# Patient Record
Sex: FEMALE
[De-identification: names, ages, dates, MRNs, and addresses within clinical notes are randomized per-mention and may not be internally consistent; named-entity substitution may affect disease eponyms.]

---

## 2022-08-27 ENCOUNTER — NON-APPOINTMENT (OUTPATIENT)
Age: 37
End: 2022-08-27

## 2023-03-23 PROBLEM — Z00.00 ENCOUNTER FOR PREVENTIVE HEALTH EXAMINATION: Status: ACTIVE | Noted: 2023-03-23

## 2023-03-30 ENCOUNTER — APPOINTMENT (OUTPATIENT)
Dept: OTOLARYNGOLOGY | Facility: CLINIC | Age: 38
End: 2023-03-30
Payer: COMMERCIAL

## 2023-03-30 VITALS — HEIGHT: 63 IN | WEIGHT: 140 LBS | BODY MASS INDEX: 24.8 KG/M2

## 2023-03-30 DIAGNOSIS — Z78.9 OTHER SPECIFIED HEALTH STATUS: ICD-10-CM

## 2023-03-30 DIAGNOSIS — G47.8 OTHER SLEEP DISORDERS: ICD-10-CM

## 2023-03-30 DIAGNOSIS — Z80.0 FAMILY HISTORY OF MALIGNANT NEOPLASM OF DIGESTIVE ORGANS: ICD-10-CM

## 2023-03-30 PROCEDURE — 31231 NASAL ENDOSCOPY DX: CPT

## 2023-03-30 PROCEDURE — 99203 OFFICE O/P NEW LOW 30 MIN: CPT | Mod: 25

## 2023-03-30 NOTE — HISTORY OF PRESENT ILLNESS
[de-identified] : DELMAR SOUTH is a 38 year old patient here for evaluation for snoring.  She has a 2-year history of snoring.  It is loud and heavy.  Her partner needs to sleep in another room.  It is not positional.  She does not feel that she gets good sleep.  She has frequent daytime fatigue and would be prone to naps.  She also has nasal obstruction.  She sleeps a regular schedule.  She gained 20 pounds since she started Lexapro.  She is tapering off the medication.  She saw another surgeon who recommended surgery.\par \par She has not had allergy testing\par She does not get recurrent sinusitis\par She denies a history of reflux

## 2023-03-30 NOTE — ASSESSMENT
[FreeTextEntry1] : She has a history of snoring.  She may have obstructive sleep apnea.\par \par She has chronic rhinitis.  On exam, she has a deviated septum and inferior turbinate hypertrophy\par \par Plan\par -Findings and management options were discussed with the patient.\par - The patient was asked to avoid sleeping on her back, eating before bed and drinking alcohol at night\par - Good sleep hygiene recommended\par - Weight loss-she is working on it\par - Elevation of the head of bed at night\par - Reflux precautions.  However, she did not have a lot of findings suggestive of reflux\par -Nasal saline irrigations as needed.  I recommended a trial of a nasal steroid spray\par -She may consider allergy evaluation\par -She may benefit from nasal procedures such as septoplasty and bilateral inferior turbinate submucous resection.  That would improve her nasal breathing but it is unclear how much it would help the snoring\par - Sleep study to rule out obstructive sleep apnea\par - Options for treatment will be discussed after the sleep study.  \par - Follow up after the sleep study\par - call and return earlier if any concerns.

## 2023-04-13 ENCOUNTER — OUTPATIENT (OUTPATIENT)
Dept: OUTPATIENT SERVICES | Facility: HOSPITAL | Age: 38
LOS: 1 days | End: 2023-04-13
Payer: COMMERCIAL

## 2023-04-13 ENCOUNTER — APPOINTMENT (OUTPATIENT)
Dept: SLEEP CENTER | Facility: HOME HEALTH | Age: 38
End: 2023-04-13
Payer: COMMERCIAL

## 2023-04-13 PROCEDURE — 95800 SLP STDY UNATTENDED: CPT

## 2023-04-13 PROCEDURE — 95800 SLP STDY UNATTENDED: CPT | Mod: 26

## 2023-04-14 DIAGNOSIS — G47.33 OBSTRUCTIVE SLEEP APNEA (ADULT) (PEDIATRIC): ICD-10-CM

## 2023-05-04 ENCOUNTER — APPOINTMENT (OUTPATIENT)
Dept: OTOLARYNGOLOGY | Facility: CLINIC | Age: 38
End: 2023-05-04
Payer: COMMERCIAL

## 2023-05-04 VITALS — BODY MASS INDEX: 25.52 KG/M2 | WEIGHT: 144 LBS | HEIGHT: 63 IN

## 2023-05-04 DIAGNOSIS — J34.3 HYPERTROPHY OF NASAL TURBINATES: ICD-10-CM

## 2023-05-04 DIAGNOSIS — J31.0 CHRONIC RHINITIS: ICD-10-CM

## 2023-05-04 DIAGNOSIS — R06.83 SNORING: ICD-10-CM

## 2023-05-04 PROCEDURE — 99213 OFFICE O/P EST LOW 20 MIN: CPT

## 2023-05-04 RX ORDER — SEMAGLUTIDE 1.34 MG/ML
INJECTION, SOLUTION SUBCUTANEOUS
Refills: 0 | Status: ACTIVE | COMMUNITY

## 2023-05-04 RX ORDER — ESCITALOPRAM OXALATE 5 MG/1
TABLET, FILM COATED ORAL
Refills: 0 | Status: DISCONTINUED | COMMUNITY
End: 2023-05-04

## 2023-05-04 NOTE — ASSESSMENT
Fluence #1 (J/Cm2 Or Mj/Cm2): 450
[FreeTextEntry1] : She has a history of snoring with frequent daytime fatigue.  Sleep study did not show significant sleep apnea.  Her AHI was 1.5.  She has stopped Lexapro and is starting of Ozempic to lose weight.\par \par She has chronic rhinitis.  On exam, she has a deviated septum and inferior turbinate hypertrophy\par \par Plan\par -Findings and management options were discussed with the patient.\par -We discussed in detail treatment options for snoring.  Her options are positional changes, weight loss, oral appliance, and surgery.  She may benefit from nasal surgery (septoplasty and bilateral inferior turbinate submucous resection) to improve her breathing.  It is difficult to say how much it would impact snoring.  She could consider LAUP.\par -She will continue to avoid sleeping on her back, eating before bed, and drinking alcohol\par -She will continue with weight loss\par -I recommended a trial of a nasal steroid spray for 2 to 3 weeks\par -If she continues to have significant daytime fatigue, I recommend evaluation at the sleep center.  She may require a sleep study in the lab to ensure she does not have sleep apnea\par -She is going to think over her options\par -I will see her back in 2 to 3 months.  I have given her name of 1 my colleagues who performs surgery for snoring.  I have asked her to call me if she is interested in nasal procedures to improve her breathing which I performed.\par - call and return earlier if any concerns. 
Location #5: R arm
Mode: repeat paint
Location #4: R hand
Consent: Written consent obtained, risks reviewed including but not limited to crusting, scabbing, blistering, scarring, darker or lighter pigmentary change, incidental hair removal, bruising, and/or incomplete removal.
Spot Size: 2 x 2 cm
Detail Level: Detailed
Fluence #4 (J/Cm2 Or Mj/Cm2): 595
Billing: [id number] (Total area less than 250 cm2)
Post-Care Instructions: I reviewed with the patient in detail post-care instructions. Patient should stay away from the sun and wear sun protection until treated areas are fully healed.
Location #1: neck
Location #2: back, right shoulder
Comments: Pt is doing well with current treatment. Did not treat red spots on shoulder. Advised to apply aloe if to experiences stinging after treatment.
Fluence Units: J/cm2
Location #3: CHRIS castillo
Total Square Area In Cm2 (Optional): 160
Treatment Number: 15

## 2023-05-04 NOTE — HISTORY OF PRESENT ILLNESS
[de-identified] : DELMAR SOUTH is a 38 year old patient here for sleep study review.  She has a history of loud heavy snoring for the past 2 years.  She also reports daytime fatigue and nasal obstruction.  She has been working on weight loss.  She has tapered off Lexapro and started Ozempic.  She had been considering nasal surgery.  She uses a nasal steroid spray although not regularly.  Her sleep study did not show significant obstructive sleep apnea.  Her AHI (4%) was 1.5.  Using 3%, was 6.0.  Her lowest oxygen saturation was 94%.\par \par She has not had allergy testing\par She does not get recurrent sinusitis\par She denies a history of reflux